# Patient Record
Sex: MALE | Race: ASIAN
[De-identification: names, ages, dates, MRNs, and addresses within clinical notes are randomized per-mention and may not be internally consistent; named-entity substitution may affect disease eponyms.]

---

## 2019-10-23 PROBLEM — Z00.00 ENCOUNTER FOR PREVENTIVE HEALTH EXAMINATION: Status: ACTIVE | Noted: 2019-10-23

## 2019-10-24 ENCOUNTER — APPOINTMENT (OUTPATIENT)
Dept: NEUROLOGY | Facility: CLINIC | Age: 49
End: 2019-10-24
Payer: COMMERCIAL

## 2019-10-24 VITALS
HEIGHT: 68 IN | SYSTOLIC BLOOD PRESSURE: 109 MMHG | TEMPERATURE: 97.5 F | BODY MASS INDEX: 21.67 KG/M2 | OXYGEN SATURATION: 99 % | HEART RATE: 93 BPM | DIASTOLIC BLOOD PRESSURE: 71 MMHG | WEIGHT: 143 LBS

## 2019-10-24 DIAGNOSIS — Z78.9 OTHER SPECIFIED HEALTH STATUS: ICD-10-CM

## 2019-10-24 PROCEDURE — 99243 OFF/OP CNSLTJ NEW/EST LOW 30: CPT

## 2019-10-24 RX ORDER — GABAPENTIN 300 MG/1
300 CAPSULE ORAL
Qty: 90 | Refills: 2 | Status: ACTIVE | COMMUNITY

## 2019-10-24 NOTE — HISTORY OF PRESENT ILLNESS
[FreeTextEntry1] : 49-year-old man with no significant past medical history who presents for evaluation of left forearm pain and left hand weakness x 2 weeks.\par \par He reports that about 2 weeks ago he noticed the abrupt onset of severe pain in his medial left forearm.  He tried taking Advil 400 mg without relief.  He also tried gabapentin 300 mg, again without relief.  The pain is worse at night/when lying down, regardless of whether he is lying on his back or on his side.  Over the past few days he has also noticed weakness and clumsiness with his left hand -- difficulty tapping his index finger to his thumb, difficulty picking up a cup.  No numbness or tingling.  No neck pain or back pain.  No pain or weakness in the right arm or in either leg.\par \par He has not had any recent injuries, but he does think he may have sustained this injury by lifting up his 1 year old and 4 year old.  He does have a remote rotator cuff tear on the same side.

## 2019-10-24 NOTE — CONSULT LETTER
[Dear  ___] : Dear  [unfilled], [( Thank you for referring [unfilled] for consultation for _____ )] : Thank you for referring [unfilled] for consultation for [unfilled] [Please see my note below.] : Please see my note below. [Consult Closing:] : Thank you very much for allowing me to participate in the care of this patient.  If you have any questions, please do not hesitate to contact me. [Sincerely,] : Sincerely, [FreeTextEntry2] : Wyatt Camargo MD\par [address]\[place]  [address] [FreeTextEntry3] : \par \par \par Gela Perez MD

## 2019-10-24 NOTE — DISCUSSION/SUMMARY
[FreeTextEntry1] : 49-year-old man with left forearm pain and left hand weakness x 2 weeks.  Initially suspected a radial neuropathy due to finger extensor and supinator weakness, but opposition and pronation weakness localize to the median nerve while pain in the medial forearm comes from the medial antebrachial cutaneous nerve.  Will need NCS/EMG for further evaluation.  In the meantime recommend ongoing NSAID treatment and physical therapy.\par \par # Left forearm pain and hand weakness, new\par -NCS/EMG of left upper extremity, case discussed with neuromuscular specialist Dr. Davey who will perform this test\par -Okay to increase ibuprofen to 600 mg QHS and gabapentin to 600 mg QHS, recommended trying both and seeing which helps most\par -Physical therapy

## 2019-10-24 NOTE — PHYSICAL EXAM
[FreeTextEntry1] : Physical Exam\par Constitutional: no apparent distress\par Psychiatric: normal affect, euthyhmic, alert and oriented x 3\par Musculoskeletal: extremities warm, well-perfused, normal range of motion\par Skin: no rashes, bruises, or lesions\par \par Neurologic Exam:\par Mental Status: awake and alert, oriented to time, place, and person, attention intact, memory intact, speech fluent and prosodic with no paraphasic errors, repetition intact, follows simple and complex commands, normal fund of knowledge\par Cranial Nerves: I: deferred; II: pupils equal; III, IV, VI: extraocular movements full with no nystagmus; V: facial sensation intact and symmetric; VII: facial power symmetric; VIII: hearing intact to finger rub; IX/X: palate elevates symmetrically, no dysarthria; XI: shoulder shrug symmetric; XII: tongue protrudes midline\par Motor: normal bulk and tone, no orbiting or pronator drift, power 5/5 to confrontation throughout including shoulder abduction, elbow flexion and extension, pronation, supination wrist flexion and extension, finger abduction, finger extension, finger flexion, thumb opposition, hip flexion, knee flexion and extension, plantarflexion, and dorsiflexion with the exception of left thumb opposition, left index finger extension at the MCP and flexion at the PIP, left forearm pronation and supination\par Sensation: intact to light touch throughout upper extremities bilaterally \par Coordination: finger-nose-finger intact bilaterally, normal rapid alternating movements and finger and toe tapping\par Reflexes: 2+ biceps, triceps, brachioradialis, patella\par Gait: narrow base, normal stance and stride, normal arm swing

## 2019-10-28 ENCOUNTER — TRANSCRIPTION ENCOUNTER (OUTPATIENT)
Age: 49
End: 2019-10-28

## 2019-11-01 ENCOUNTER — APPOINTMENT (OUTPATIENT)
Dept: NEUROLOGY | Facility: CLINIC | Age: 49
End: 2019-11-01
Payer: COMMERCIAL

## 2019-11-01 ENCOUNTER — LABORATORY RESULT (OUTPATIENT)
Age: 49
End: 2019-11-01

## 2019-11-01 VITALS
OXYGEN SATURATION: 98 % | SYSTOLIC BLOOD PRESSURE: 109 MMHG | HEIGHT: 68 IN | HEART RATE: 71 BPM | DIASTOLIC BLOOD PRESSURE: 73 MMHG | BODY MASS INDEX: 21.67 KG/M2 | WEIGHT: 143 LBS

## 2019-11-01 PROCEDURE — 99214 OFFICE O/P EST MOD 30 MIN: CPT | Mod: 25

## 2019-11-01 PROCEDURE — 76882 US LMTD JT/FCL EVL NVASC XTR: CPT | Mod: LT

## 2019-11-01 PROCEDURE — 95913 NRV CNDJ TEST 13/> STUDIES: CPT

## 2019-11-01 PROCEDURE — 95886 MUSC TEST DONE W/N TEST COMP: CPT

## 2019-11-02 LAB
ALBUMIN MFR SERPL ELPH: 64.6 %
ALBUMIN SERPL-MCNC: 4.5 G/DL
ALBUMIN/GLOB SERPL: 1.9 RATIO
ALPHA1 GLOB MFR SERPL ELPH: 3.1 %
ALPHA1 GLOB SERPL ELPH-MCNC: 0.2 G/DL
ALPHA2 GLOB MFR SERPL ELPH: 6.6 %
ALPHA2 GLOB SERPL ELPH-MCNC: 0.5 G/DL
B-GLOBULIN MFR SERPL ELPH: 11.8 %
B-GLOBULIN SERPL ELPH-MCNC: 0.8 G/DL
C3 SERPL-MCNC: 99 MG/DL
C4 SERPL-MCNC: 31 MG/DL
ERYTHROCYTE [SEDIMENTATION RATE] IN BLOOD BY WESTERGREN METHOD: 3 MM/HR
GAMMA GLOB FLD ELPH-MCNC: 1 G/DL
GAMMA GLOB MFR SERPL ELPH: 13.9 %
INTERPRETATION SERPL IEP-IMP: NORMAL
M PROTEIN SPEC IFE-MCNC: NORMAL
PROT SERPL-MCNC: 6.9 G/DL
PROT SERPL-MCNC: 6.9 G/DL

## 2019-11-04 LAB — MPO AB + PR3 PNL SER: NORMAL

## 2019-11-04 NOTE — HISTORY OF PRESENT ILLNESS
[FreeTextEntry1] : Referred by Dr. Perez for left hand weakness and arm pain\par Started about 3 weeks ago with pain in medial forearm \par Then started having weakness in thumb and index finger\par Symptoms have improved somewhat since last week\par Taking advil and gabapentin \par Denies cold/flu like symptoms\par Denies trauma\par \par 13 pt review of systems performed and reviewed with patient (General, Eyes, Ears, Cardiovascular, Respiratory, Gastrointestinal, Genitourinary, Musculoskeletal, Skin, Endocrine, Hematologic, Psychiatric, Neurologic)\par Past medical history, surgical history, social history, and family history reviewed with patient\par See scanned document for details

## 2019-11-04 NOTE — PHYSICAL EXAM
[FreeTextEntry1] : Motor: 3/5 left FPL, 4/5 opponens, 4- pincer, otherwise 5/5 symmetric\par Sensory: intact and symmetric to LT/PP b/l\par Reflexes: 2+ UE symmetric\par Gait: normal

## 2019-11-04 NOTE — ASSESSMENT
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

## 2019-11-05 LAB
ANA SER IF-ACNC: NEGATIVE
CRYOGLOB SERPL-MCNC: NEGATIVE

## 2019-11-06 NOTE — PROCEDURE
[FreeTextEntry1] : Nerve Conduction, Electromyography and Neuromuscular Ultrasound Report [FreeTextEntry3] : Electro Physiologic Findings:  Limb temperature was monitored and maintained at approximately 32 – 36° C in the upper extremities.  The left median sensory amplitude was within normal limits, but < 50% of the contralateral side; otherwise the median sensory, motor and mixed nerve responses were normal and symmetric. The ulnar sensory and motor responses were normal and symmetric. The left medial antebrachial cutaneous sensory amplitude was low (about 20-25% the amplitude of the right side). The radial sensory responses were normal bilaterally and symmetric. The median F-wave latencies were within normal limits bilaterally.   Needle electromyography was performed on select left upper extremity appendicular muscles. There was severe active denervation in the pronator teres and flexor pollicis longus; no motor units could be recruited in either of these muscles. The other muscles tested were unremarkable.   Neuromuscular Ultrasound was performed on the bilateral median nerves, using an [employer] Gamma with a linear high-frequency (12-19Hz) transducer probe.   Right Median Nerve 	Size (Normal)	Echogenicity	Fascicular Pattern Elbow	12 (<13 mm2)	Normal	Normal  Left Median Nerve 	Size (Normal)	Echogenicity	Fascicular Pattern Wrist	13 (<14 mm2)	Normal	Heterogenous Forearm	9 (<10  mm2)	Normal	Normal Elbow	21 (<13 mm2)	Normal	Heterogenous Above elbow	11 (<12 mm2)	Normal	Heterogenous   Clinical Electrophysiological Impression:    This electrodiagnostic study demonstrated abnormalities most consistent with a left immune brachial plexus neuropathy most significantly affecting the anterior interosseous nerve. There was also involvement of the median sensory and the medial antebrachial cutaneous nerves.   The left median nerve was enlarged at the elbow, and there was a heterogenous fascicular pattern in multiple locations throughout the arm (with some rather enlarged fascicles mixed in with normal size fascicles); this is also consistent with an immune brachial plexus neuropathy.   There was no evidence of polyneuropathy or median or ulnar nerve entrapment on this study.

## 2019-11-06 NOTE — PROCEDURE
[FreeTextEntry1] : Nerve Conduction, Electromyography and Neuromuscular Ultrasound Report [FreeTextEntry3] : Electro Physiologic Findings:  Limb temperature was monitored and maintained at approximately 32 – 36° C in the upper extremities.  The left median sensory amplitude was within normal limits, but < 50% of the contralateral side; otherwise the median sensory, motor and mixed nerve responses were normal and symmetric. The ulnar sensory and motor responses were normal and symmetric. The left medial antebrachial cutaneous sensory amplitude was low (about 20-25% the amplitude of the right side). The radial sensory responses were normal bilaterally and symmetric. The median F-wave latencies were within normal limits bilaterally.   Needle electromyography was performed on select left upper extremity appendicular muscles. There was severe active denervation in the pronator teres and flexor pollicis longus; no motor units could be recruited in either of these muscles. The other muscles tested were unremarkable.   Neuromuscular Ultrasound was performed on the bilateral median nerves, using an [employer] Gamma with a linear high-frequency (12-19Hz) transducer probe.   Right Median Nerve 	Size (Normal)	Echogenicity	Fascicular Pattern Elbow	12 (<13 mm2)	Normal	Normal  Left Median Nerve 	Size (Normal)	Echogenicity	Fascicular Pattern Wrist	13 (<14 mm2)	Normal	Heterogenous Forearm	9 (<10  mm2)	Normal	Normal Elbow	21 (<13 mm2)	Normal	Heterogenous Above elbow	11 (<12 mm2)	Normal	Heterogenous   Clinical Electrophysiological Impression:    This electrodiagnostic study demonstrated abnormalities most consistent with a left immune brachial plexus neuropathy most significantly affecting the anterior interosseous nerve. There was also involvement of the median sensory and the medial antebrachial cutaneous nerves.   The left median nerve was enlarged at the elbow, and there was a heterogenous fascicular pattern in multiple locations throughout the arm (with some rather enlarged fascicles mixed in with normal size fascicles); this is also consistent with an immune brachial plexus neuropathy.   There was no evidence of polyneuropathy or median or ulnar nerve entrapment on this study.

## 2019-11-08 LAB — MAG AB SER QL: NEGATIVE

## 2019-11-11 ENCOUNTER — TRANSCRIPTION ENCOUNTER (OUTPATIENT)
Age: 49
End: 2019-11-11

## 2019-11-11 LAB
ASIALO-GM1 ANTIBODIES, IGG/IGM: 17 IV
GD1A ANTIBODIES, IGG/IGM: 41 IV
GD1B ANTIBODIES, IGG/IGM: 58 IV
GM1 ANTIBODIES, IGG/IGM: 32 IV
GM2 ANTIBODIES, IGG/IGM: 15 IV
GQ1B ANTIBODIES, IGG/IGM: 57 IV

## 2019-11-13 ENCOUNTER — TRANSCRIPTION ENCOUNTER (OUTPATIENT)
Age: 49
End: 2019-11-13

## 2019-12-17 ENCOUNTER — APPOINTMENT (OUTPATIENT)
Dept: NEUROLOGY | Facility: CLINIC | Age: 49
End: 2019-12-17
Payer: COMMERCIAL

## 2019-12-17 VITALS
HEART RATE: 89 BPM | TEMPERATURE: 97.5 F | DIASTOLIC BLOOD PRESSURE: 79 MMHG | BODY MASS INDEX: 21.98 KG/M2 | HEIGHT: 68 IN | OXYGEN SATURATION: 97 % | WEIGHT: 145 LBS | SYSTOLIC BLOOD PRESSURE: 140 MMHG

## 2019-12-17 PROCEDURE — 99214 OFFICE O/P EST MOD 30 MIN: CPT

## 2019-12-17 NOTE — HISTORY OF PRESENT ILLNESS
[FreeTextEntry1] : Pain subsided with a course of prednisone\par Hand is still weak although somewhat improved - able to  pieces of paper with left hand\par At the end of last week he started having pain in the medial left wrist (ulnar head) - thinks its related to typing on computer\par No new weakness, numbness, tingling etc\par No longer taking gabapentin

## 2019-12-17 NOTE — PHYSICAL EXAM
[FreeTextEntry1] : Motor: 4-/5 left FPL, 4/5 opponens, 4 pincer, wrist flexion 4/5, otherwise 5/5 symmetric\par Sensory: intact and symmetric to LT/PP b/l\par Reflexes: 2+ UE symmetric\par Gait: normal.

## 2019-12-17 NOTE — ASSESSMENT
[FreeTextEntry1] : Symptoms improving\par Left wrist pain likely positional from typing - recommend wrist guard / cushion \par Return in 3 months, contact me sooner if new or worsening symptoms

## 2020-03-17 ENCOUNTER — APPOINTMENT (OUTPATIENT)
Dept: NEUROLOGY | Facility: CLINIC | Age: 50
End: 2020-03-17

## 2020-05-13 ENCOUNTER — APPOINTMENT (OUTPATIENT)
Dept: NEUROLOGY | Facility: CLINIC | Age: 50
End: 2020-05-13
Payer: COMMERCIAL

## 2020-05-13 DIAGNOSIS — R29.898 OTHER SYMPTOMS AND SIGNS INVOLVING THE MUSCULOSKELETAL SYSTEM: ICD-10-CM

## 2020-05-13 DIAGNOSIS — M79.602 PAIN IN LEFT ARM: ICD-10-CM

## 2020-05-13 DIAGNOSIS — G54.0 BRACHIAL PLEXUS DISORDERS: ICD-10-CM

## 2020-05-13 PROCEDURE — 99213 OFFICE O/P EST LOW 20 MIN: CPT | Mod: 95

## 2020-05-13 NOTE — HISTORY OF PRESENT ILLNESS
[Home] : at home, [unfilled] , at the time of the visit. [Other Location: e.g. Home (Enter Location, City,State)___] : at [unfilled] [Patient] : the patient [Self] : self [FreeTextEntry1] : For the past two weeks the index finger on the left has been swollen, which has been getting better\par He still has trouble flexing that finger ting the tip\par Pincer motion has improved \par The thumb has continued to improve \par He has not done any OT yet

## 2020-05-13 NOTE — PHYSICAL EXAM
[FreeTextEntry1] : Mild swelling left index finger from PIP to tip\par Inability to flex left index finger\par Left thumb mobility improved with intact pincer motion

## 2020-05-13 NOTE — ASSESSMENT
[FreeTextEntry1] : index finger swelling likely due to some minor injury that pt likely did not notice because finger is numb \par Strength and sensation continue to improve\par OT referral given\par f/u 2 months
